# Patient Record
Sex: MALE | Race: WHITE | NOT HISPANIC OR LATINO | Employment: FULL TIME | URBAN - METROPOLITAN AREA
[De-identification: names, ages, dates, MRNs, and addresses within clinical notes are randomized per-mention and may not be internally consistent; named-entity substitution may affect disease eponyms.]

---

## 2021-01-25 ENCOUNTER — OFFICE VISIT (OUTPATIENT)
Dept: FAMILY MEDICINE CLINIC | Facility: CLINIC | Age: 26
End: 2021-01-25

## 2021-01-25 VITALS
WEIGHT: 142.8 LBS | HEART RATE: 78 BPM | BODY MASS INDEX: 19.99 KG/M2 | RESPIRATION RATE: 18 BRPM | TEMPERATURE: 97.8 F | DIASTOLIC BLOOD PRESSURE: 68 MMHG | HEIGHT: 71 IN | SYSTOLIC BLOOD PRESSURE: 118 MMHG

## 2021-01-25 DIAGNOSIS — R51.9 NEW ONSET HEADACHE: Primary | ICD-10-CM

## 2021-01-25 DIAGNOSIS — G50.0 TRIGEMINAL NEURALGIA OF RIGHT SIDE OF FACE: ICD-10-CM

## 2021-01-25 PROCEDURE — 99204 OFFICE O/P NEW MOD 45 MIN: CPT | Performed by: FAMILY MEDICINE

## 2021-01-25 NOTE — PROGRESS NOTES
Subjective   Collett Larry is a 25 y.o. male.     History of Present Illness     He is here due to headaches  Pt has been seen by neurologist and other physicians from 2 different ERs  He is on 5 different medications    Neurologist in [place] (some one in [facility]) has him on tegretol as well as baclofen    1/15/2021 he went to the ER due to HA.  Had had sharp pains on the right side of his head  The CT of his head was normal    He went to the ER again on 1/19 and was then placed on the prednisone burst as well as imoitrex PRN    He then saw a neurologist on 1/18 in [facility] who diagnosed him with trigeminal neuraglia and is working on MRI and will f/u on 2/26  Pt would prefer a local neurologist as he lives in [place] now  He went to the ER again on 1/19 and was then placed on the prednisone burst as well as imitrex PRN    Today he is still having pain on the right side of his head  The pain is a lot better but is still there      The following portions of the patient's history were reviewed and updated as appropriate: allergies, current medications, past family history, past medical history, past social history, past surgical history and problem list.    Review of Systems   Constitutional: Negative.    Eyes: Positive for visual disturbance (slightly blurry at onset of HA).   Respiratory: Negative.  Negative for shortness of breath.    Cardiovascular: Negative.  Negative for chest pain.   Gastrointestinal: Positive for nausea (a little).   Musculoskeletal: Negative.    Skin: Negative.    Neurological: Positive for headaches.   Psychiatric/Behavioral: Negative.  Negative for dysphoric mood. The patient is not nervous/anxious.    All other systems reviewed and are negative.      Objective   Physical Exam  Vitals signs and nursing note reviewed.   Constitutional:       General: He is not in acute distress.     Appearance: Normal appearance. He is well-developed.   HENT:      Head: Normocephalic and  atraumatic.        Right Ear: Tympanic membrane, ear canal and external ear normal.      Left Ear: Tympanic membrane, ear canal and external ear normal.   Eyes:      Extraocular Movements: Extraocular movements intact.      Conjunctiva/sclera: Conjunctivae normal.      Pupils: Pupils are equal, round, and reactive to light.   Cardiovascular:      Rate and Rhythm: Normal rate and regular rhythm.      Heart sounds: Normal heart sounds.   Pulmonary:      Effort: Pulmonary effort is normal.      Breath sounds: Normal breath sounds.   Abdominal:      General: Abdomen is flat. Bowel sounds are normal. There is no distension.      Palpations: Abdomen is soft.      Tenderness: There is no abdominal tenderness. There is no guarding or rebound.   Neurological:      Mental Status: He is alert and oriented to person, place, and time.   Psychiatric:         Mood and Affect: Mood normal.         Behavior: Behavior normal.         Thought Content: Thought content normal.         Judgment: Judgment normal.         Assessment/Plan   Diagnoses and all orders for this visit:    1. New onset headache (Primary)  -     Sedimentation Rate    2. Trigeminal neuralgia of right side of face  -     Sedimentation Rate    he has new onset of HA and I am the fourth different provider he has seen in the last 10 days for this.  He actually feels better at this time.  The issues is there is not an easy treatment for this entity.  I will request all the old records from the 3 different places he has seen but this will require neurology for treatment.  Will work on local neurologist as this is easier for him to get to.  Work up still ongoing though and he just started tegretol last week.  Will check sed rate today, continue medicine as prescribed without change.  Pt was off work last week.  He wants to go back to work and feels he can function at work.  I do not see anything that would prevent him to work.  I cannot give him an excuse for prior days   though but will release him to work.  Still needs to see neuro and get MRI as ordered  I am requesting records from the 2 ERs as well as neurologist in [facility]

## 2021-01-26 LAB — ERYTHROCYTE [SEDIMENTATION RATE] IN BLOOD BY WESTERGREN METHOD: 4 MM/HR (ref 0–15)
